# Patient Record
Sex: FEMALE | Race: WHITE
[De-identification: names, ages, dates, MRNs, and addresses within clinical notes are randomized per-mention and may not be internally consistent; named-entity substitution may affect disease eponyms.]

---

## 2021-12-20 ENCOUNTER — NURSE TRIAGE (OUTPATIENT)
Dept: OTHER | Facility: CLINIC | Age: 21
End: 2021-12-20

## 2021-12-21 NOTE — TELEPHONE ENCOUNTER
Subjective: Caller states \"I had my booster today and have some questions\"     Current Symptoms: Had Moderna booster 6.5 hours ago and took a nap. Woke up 1 hour ago and fit bit showed heart rate of 130. Now is 80, which is typical for caller. Felt a little short of breath but feels fine now. Onset: 1 hour ago; sudden    Associated Symptoms: NA    Pain Severity: 0/10; N/A; none    Temperature: doesn't feel feverish hasn't checked    What has been tried: Took a tylenol an hour ago     Pregnant: No    Recommended disposition: Follow home care advice    Care advice provided, patient verbalizes understanding; denies any other questions or concerns; instructed to call back for any new or worsening symptoms. follow home care advice    This triage is a result of a call to [address] [place] [address]. Please do not respond to the triage nurse through this encounter. Any subsequent communication should be directly with the patient.       Reason for Disposition   COVID-19 vaccine, systemic reactions (e.g., fatigue, fever, muscle aches), questions about    Protocols used: COVID-19 - VACCINE QUESTIONS AND REACTIONS-ADULT-